# Patient Record
Sex: MALE | Race: WHITE | NOT HISPANIC OR LATINO | Employment: OTHER | URBAN - METROPOLITAN AREA
[De-identification: names, ages, dates, MRNs, and addresses within clinical notes are randomized per-mention and may not be internally consistent; named-entity substitution may affect disease eponyms.]

---

## 2017-01-01 ENCOUNTER — HOSPITAL ENCOUNTER (INPATIENT)
Facility: HOSPITAL | Age: 82
LOS: 2 days | DRG: 283 | End: 2017-07-09
Attending: EMERGENCY MEDICINE | Admitting: HOSPITALIST
Payer: MEDICARE

## 2017-01-01 ENCOUNTER — TELEPHONE (OUTPATIENT)
Dept: INTERNAL MEDICINE | Facility: CLINIC | Age: 82
End: 2017-01-01

## 2017-01-01 VITALS
OXYGEN SATURATION: 90 % | TEMPERATURE: 97 F | BODY MASS INDEX: 25.41 KG/M2 | WEIGHT: 187.63 LBS | SYSTOLIC BLOOD PRESSURE: 57 MMHG | RESPIRATION RATE: 26 BRPM | DIASTOLIC BLOOD PRESSURE: 38 MMHG | HEIGHT: 72 IN | HEART RATE: 104 BPM

## 2017-01-01 DIAGNOSIS — Z66 DNR (DO NOT RESUSCITATE): ICD-10-CM

## 2017-01-01 DIAGNOSIS — E87.20 LACTIC ACIDOSIS: ICD-10-CM

## 2017-01-01 DIAGNOSIS — I21.4 NSTEMI (NON-ST ELEVATED MYOCARDIAL INFARCTION): ICD-10-CM

## 2017-01-01 DIAGNOSIS — I48.19 PERSISTENT ATRIAL FIBRILLATION: ICD-10-CM

## 2017-01-01 DIAGNOSIS — J96.01 ACUTE RESPIRATORY FAILURE WITH HYPOXIA: Primary | ICD-10-CM

## 2017-01-01 DIAGNOSIS — G30.1 LATE ONSET ALZHEIMER'S DISEASE WITH BEHAVIORAL DISTURBANCE: ICD-10-CM

## 2017-01-01 DIAGNOSIS — R65.21 SEPTIC SHOCK: ICD-10-CM

## 2017-01-01 DIAGNOSIS — A41.9 SEPTIC SHOCK: ICD-10-CM

## 2017-01-01 DIAGNOSIS — N17.9 AKI (ACUTE KIDNEY INJURY): ICD-10-CM

## 2017-01-01 DIAGNOSIS — E11.9 TYPE 2 DIABETES MELLITUS WITHOUT COMPLICATION: ICD-10-CM

## 2017-01-01 DIAGNOSIS — F02.818 LATE ONSET ALZHEIMER'S DISEASE WITH BEHAVIORAL DISTURBANCE: ICD-10-CM

## 2017-01-01 LAB
ABO + RH BLD: NORMAL
ALBUMIN SERPL BCP-MCNC: 2.5 G/DL
ALBUMIN SERPL BCP-MCNC: 2.6 G/DL
ALBUMIN SERPL BCP-MCNC: 2.8 G/DL
ALLENS TEST: ABNORMAL
ALP SERPL-CCNC: 49 U/L
ALP SERPL-CCNC: 54 U/L
ALP SERPL-CCNC: 54 U/L
ALT SERPL W/O P-5'-P-CCNC: 27 U/L
ALT SERPL W/O P-5'-P-CCNC: 32 U/L
ALT SERPL W/O P-5'-P-CCNC: 32 U/L
ANION GAP SERPL CALC-SCNC: 12 MMOL/L
ANION GAP SERPL CALC-SCNC: 15 MMOL/L
ANION GAP SERPL CALC-SCNC: 16 MMOL/L
ANION GAP SERPL CALC-SCNC: 22 MMOL/L
AORTIC VALVE REGURGITATION: ABNORMAL
AORTIC VALVE STENOSIS: ABNORMAL
APTT BLDCRRT: 27.1 SEC
AST SERPL-CCNC: 118 U/L
AST SERPL-CCNC: 122 U/L
AST SERPL-CCNC: 64 U/L
BACTERIA UR CULT: NO GROWTH
BASOPHILS # BLD AUTO: 0 K/UL
BASOPHILS # BLD AUTO: 0.01 K/UL
BASOPHILS # BLD AUTO: 0.02 K/UL
BASOPHILS NFR BLD: 0 %
BASOPHILS NFR BLD: 0.1 %
BASOPHILS NFR BLD: 0.3 %
BILIRUB SERPL-MCNC: 0.6 MG/DL
BILIRUB SERPL-MCNC: 0.6 MG/DL
BILIRUB SERPL-MCNC: 0.7 MG/DL
BILIRUB UR QL STRIP: NEGATIVE
BLD GP AB SCN CELLS X3 SERPL QL: NORMAL
BNP SERPL-MCNC: 10 PG/ML
BUN SERPL-MCNC: 27 MG/DL
BUN SERPL-MCNC: 31 MG/DL
BUN SERPL-MCNC: 34 MG/DL
BUN SERPL-MCNC: 56 MG/DL
CALCIUM SERPL-MCNC: 8.9 MG/DL
CALCIUM SERPL-MCNC: 9 MG/DL
CALCIUM SERPL-MCNC: 9 MG/DL
CALCIUM SERPL-MCNC: 9.5 MG/DL
CHLORIDE SERPL-SCNC: 93 MMOL/L
CHLORIDE SERPL-SCNC: 96 MMOL/L
CHLORIDE SERPL-SCNC: 98 MMOL/L
CHLORIDE SERPL-SCNC: 99 MMOL/L
CLARITY UR: CLEAR
CO2 SERPL-SCNC: 14 MMOL/L
CO2 SERPL-SCNC: 16 MMOL/L
CO2 SERPL-SCNC: 16 MMOL/L
CO2 SERPL-SCNC: 17 MMOL/L
COLOR UR: ABNORMAL
CORTIS SERPL-MCNC: 27 UG/DL
CREAT SERPL-MCNC: 1.3 MG/DL
CREAT SERPL-MCNC: 1.4 MG/DL
CREAT SERPL-MCNC: 1.4 MG/DL
CREAT SERPL-MCNC: 2.6 MG/DL
DELSYS: ABNORMAL
DIASTOLIC DYSFUNCTION: YES
DIFFERENTIAL METHOD: ABNORMAL
EOSINOPHIL # BLD AUTO: 0 K/UL
EOSINOPHIL # BLD AUTO: 0 K/UL
EOSINOPHIL # BLD AUTO: 0.1 K/UL
EOSINOPHIL NFR BLD: 0 %
EOSINOPHIL NFR BLD: 0 %
EOSINOPHIL NFR BLD: 1.4 %
EP: 8
ERYTHROCYTE [DISTWIDTH] IN BLOOD BY AUTOMATED COUNT: 12.9 %
ERYTHROCYTE [DISTWIDTH] IN BLOOD BY AUTOMATED COUNT: 13 %
ERYTHROCYTE [DISTWIDTH] IN BLOOD BY AUTOMATED COUNT: 13.3 %
ERYTHROCYTE [SEDIMENTATION RATE] IN BLOOD BY WESTERGREN METHOD: 4 MM/H
EST. GFR  (AFRICAN AMERICAN): 25 ML/MIN/1.73 M^2
EST. GFR  (AFRICAN AMERICAN): 52 ML/MIN/1.73 M^2
EST. GFR  (AFRICAN AMERICAN): 52 ML/MIN/1.73 M^2
EST. GFR  (AFRICAN AMERICAN): 57 ML/MIN/1.73 M^2
EST. GFR  (NON AFRICAN AMERICAN): 21 ML/MIN/1.73 M^2
EST. GFR  (NON AFRICAN AMERICAN): 45 ML/MIN/1.73 M^2
EST. GFR  (NON AFRICAN AMERICAN): 45 ML/MIN/1.73 M^2
EST. GFR  (NON AFRICAN AMERICAN): 49 ML/MIN/1.73 M^2
ESTIMATED AVG GLUCOSE: 111 MG/DL
ESTIMATED PA SYSTOLIC PRESSURE: 41.18
FIO2: 100
GLUCOSE SERPL-MCNC: 232 MG/DL
GLUCOSE SERPL-MCNC: 272 MG/DL
GLUCOSE SERPL-MCNC: 319 MG/DL
GLUCOSE SERPL-MCNC: 336 MG/DL
GLUCOSE UR QL STRIP: NEGATIVE
HBA1C MFR BLD HPLC: 5.5 %
HCO3 UR-SCNC: 20.1 MMOL/L (ref 24–28)
HCT VFR BLD AUTO: 37.6 %
HCT VFR BLD AUTO: 38.4 %
HCT VFR BLD AUTO: 38.9 %
HCT VFR BLD CALC: 37 %PCV (ref 36–54)
HGB BLD-MCNC: 12.9 G/DL
HGB BLD-MCNC: 13 G/DL
HGB BLD-MCNC: 13 G/DL
HGB BLD-MCNC: 13.2 G/DL
HGB UR QL STRIP: NEGATIVE
INR PPP: 1.1
IP: 15
KETONES UR QL STRIP: NEGATIVE
LACTATE SERPL-SCNC: 5.7 MMOL/L
LACTATE SERPL-SCNC: 5.9 MMOL/L
LACTATE SERPL-SCNC: 7.5 MMOL/L
LACTATE SERPL-SCNC: >12 MMOL/L
LEUKOCYTE ESTERASE UR QL STRIP: NEGATIVE
LIPASE SERPL-CCNC: 6 U/L
LYMPHOCYTES # BLD AUTO: 0.4 K/UL
LYMPHOCYTES # BLD AUTO: 1.1 K/UL
LYMPHOCYTES # BLD AUTO: 1.3 K/UL
LYMPHOCYTES NFR BLD: 2.6 %
LYMPHOCYTES NFR BLD: 20.8 %
LYMPHOCYTES NFR BLD: 6.5 %
MAGNESIUM SERPL-MCNC: 2.1 MG/DL
MAGNESIUM SERPL-MCNC: 2.3 MG/DL
MAGNESIUM SERPL-MCNC: 2.8 MG/DL
MCH RBC QN AUTO: 30.9 PG
MCH RBC QN AUTO: 31.3 PG
MCH RBC QN AUTO: 31.4 PG
MCHC RBC AUTO-ENTMCNC: 33.2 %
MCHC RBC AUTO-ENTMCNC: 33.9 %
MCHC RBC AUTO-ENTMCNC: 35.1 %
MCV RBC AUTO: 90 FL
MCV RBC AUTO: 92 FL
MCV RBC AUTO: 93 FL
MITRAL VALVE MOBILITY: NORMAL
MITRAL VALVE REGURGITATION: ABNORMAL
MODE: ABNORMAL
MONOCYTES # BLD AUTO: 0 K/UL
MONOCYTES # BLD AUTO: 0.6 K/UL
MONOCYTES # BLD AUTO: 1.1 K/UL
MONOCYTES NFR BLD: 0.3 %
MONOCYTES NFR BLD: 3.4 %
MONOCYTES NFR BLD: 6.7 %
NEUTROPHILS # BLD AUTO: 14.1 K/UL
NEUTROPHILS # BLD AUTO: 15.2 K/UL
NEUTROPHILS # BLD AUTO: 4.8 K/UL
NEUTROPHILS NFR BLD: 76.9 %
NEUTROPHILS NFR BLD: 86.3 %
NEUTROPHILS NFR BLD: 93.8 %
NITRITE UR QL STRIP: NEGATIVE
PCO2 BLDA: 47.5 MMHG (ref 35–45)
PH SMN: 7.23 [PH] (ref 7.35–7.45)
PH UR STRIP: 6 [PH] (ref 5–8)
PHOSPHATE SERPL-MCNC: 3.7 MG/DL
PHOSPHATE SERPL-MCNC: 5.3 MG/DL
PHOSPHATE SERPL-MCNC: 6.1 MG/DL
PLATELET # BLD AUTO: 230 K/UL
PLATELET # BLD AUTO: 236 K/UL
PLATELET # BLD AUTO: 264 K/UL
PLATELET BLD QL SMEAR: ABNORMAL
PMV BLD AUTO: 9.1 FL
PMV BLD AUTO: 9.1 FL
PMV BLD AUTO: 9.7 FL
PO2 BLDA: 27 MMHG (ref 80–100)
POC BE: -7 MMOL/L
POC IONIZED CALCIUM: 1.03 MMOL/L (ref 1.06–1.42)
POC SATURATED O2: 40 % (ref 95–100)
POC TCO2: 22 MMOL/L (ref 23–27)
POCT GLUCOSE: 191 MG/DL (ref 70–110)
POCT GLUCOSE: 198 MG/DL (ref 70–110)
POCT GLUCOSE: 221 MG/DL (ref 70–110)
POCT GLUCOSE: 247 MG/DL (ref 70–110)
POCT GLUCOSE: 256 MG/DL (ref 70–110)
POCT GLUCOSE: 261 MG/DL (ref 70–110)
POCT GLUCOSE: 324 MG/DL (ref 70–110)
POTASSIUM BLD-SCNC: 6.3 MMOL/L (ref 3.5–5.1)
POTASSIUM SERPL-SCNC: 3.7 MMOL/L
POTASSIUM SERPL-SCNC: 4.2 MMOL/L
POTASSIUM SERPL-SCNC: 4.4 MMOL/L
POTASSIUM SERPL-SCNC: 5.4 MMOL/L
PROCALCITONIN SERPL IA-MCNC: 1.19 NG/ML
PROT SERPL-MCNC: 6.1 G/DL
PROT SERPL-MCNC: 6.1 G/DL
PROT SERPL-MCNC: 6.2 G/DL
PROT UR QL STRIP: ABNORMAL
PROTHROMBIN TIME: 11.7 SEC
RBC # BLD AUTO: 4.16 M/UL
RBC # BLD AUTO: 4.17 M/UL
RBC # BLD AUTO: 4.2 M/UL
RETIRED EF AND QEF - SEE NOTES: 20 (ref 55–65)
SAMPLE: ABNORMAL
SITE: ABNORMAL
SODIUM BLD-SCNC: 124 MMOL/L (ref 136–145)
SODIUM SERPL-SCNC: 127 MMOL/L
SODIUM SERPL-SCNC: 127 MMOL/L
SODIUM SERPL-SCNC: 129 MMOL/L
SODIUM SERPL-SCNC: 131 MMOL/L
SP GR UR STRIP: 1.02 (ref 1–1.03)
SPONT RATE: 28
TRICUSPID VALVE REGURGITATION: ABNORMAL
TROPONIN I SERPL DL<=0.01 NG/ML-MCNC: 14.11 NG/ML
TROPONIN I SERPL DL<=0.01 NG/ML-MCNC: 14.48 NG/ML
TROPONIN I SERPL DL<=0.01 NG/ML-MCNC: 14.48 NG/ML
TROPONIN I SERPL DL<=0.01 NG/ML-MCNC: 16.27 NG/ML
TROPONIN I SERPL DL<=0.01 NG/ML-MCNC: 16.41 NG/ML
TROPONIN I SERPL DL<=0.01 NG/ML-MCNC: 19 NG/ML
TSH SERPL DL<=0.005 MIU/L-ACNC: 2.97 UIU/ML
URN SPEC COLLECT METH UR: ABNORMAL
UROBILINOGEN UR STRIP-ACNC: NEGATIVE EU/DL
WBC # BLD AUTO: 16.16 K/UL
WBC # BLD AUTO: 16.39 K/UL
WBC # BLD AUTO: 9 K/UL

## 2017-01-01 PROCEDURE — 82962 GLUCOSE BLOOD TEST: CPT

## 2017-01-01 PROCEDURE — 63600175 PHARM REV CODE 636 W HCPCS: Performed by: HOSPITALIST

## 2017-01-01 PROCEDURE — 83735 ASSAY OF MAGNESIUM: CPT

## 2017-01-01 PROCEDURE — 84100 ASSAY OF PHOSPHORUS: CPT

## 2017-01-01 PROCEDURE — 99900035 HC TECH TIME PER 15 MIN (STAT)

## 2017-01-01 PROCEDURE — 27100171 HC OXYGEN HIGH FLOW UP TO 24 HOURS

## 2017-01-01 PROCEDURE — 84484 ASSAY OF TROPONIN QUANT: CPT

## 2017-01-01 PROCEDURE — 85025 COMPLETE CBC W/AUTO DIFF WBC: CPT

## 2017-01-01 PROCEDURE — 87077 CULTURE AEROBIC IDENTIFY: CPT

## 2017-01-01 PROCEDURE — 63600175 PHARM REV CODE 636 W HCPCS: Performed by: EMERGENCY MEDICINE

## 2017-01-01 PROCEDURE — 94640 AIRWAY INHALATION TREATMENT: CPT

## 2017-01-01 PROCEDURE — 83036 HEMOGLOBIN GLYCOSYLATED A1C: CPT

## 2017-01-01 PROCEDURE — 85610 PROTHROMBIN TIME: CPT

## 2017-01-01 PROCEDURE — 85730 THROMBOPLASTIN TIME PARTIAL: CPT

## 2017-01-01 PROCEDURE — 93005 ELECTROCARDIOGRAM TRACING: CPT

## 2017-01-01 PROCEDURE — 80053 COMPREHEN METABOLIC PANEL: CPT

## 2017-01-01 PROCEDURE — 81003 URINALYSIS AUTO W/O SCOPE: CPT

## 2017-01-01 PROCEDURE — 94761 N-INVAS EAR/PLS OXIMETRY MLT: CPT

## 2017-01-01 PROCEDURE — 96375 TX/PRO/DX INJ NEW DRUG ADDON: CPT

## 2017-01-01 PROCEDURE — 36415 COLL VENOUS BLD VENIPUNCTURE: CPT

## 2017-01-01 PROCEDURE — 83605 ASSAY OF LACTIC ACID: CPT | Mod: 91

## 2017-01-01 PROCEDURE — 80048 BASIC METABOLIC PNL TOTAL CA: CPT

## 2017-01-01 PROCEDURE — 25000242 PHARM REV CODE 250 ALT 637 W/ HCPCS: Performed by: HOSPITALIST

## 2017-01-01 PROCEDURE — 27000190 HC CPAP FULL FACE MASK W/VALVE

## 2017-01-01 PROCEDURE — 84484 ASSAY OF TROPONIN QUANT: CPT | Mod: 91

## 2017-01-01 PROCEDURE — 96367 TX/PROPH/DG ADDL SEQ IV INF: CPT

## 2017-01-01 PROCEDURE — 36600 WITHDRAWAL OF ARTERIAL BLOOD: CPT

## 2017-01-01 PROCEDURE — 93306 TTE W/DOPPLER COMPLETE: CPT | Mod: 26,,, | Performed by: INTERNAL MEDICINE

## 2017-01-01 PROCEDURE — 82533 TOTAL CORTISOL: CPT

## 2017-01-01 PROCEDURE — 96365 THER/PROPH/DIAG IV INF INIT: CPT

## 2017-01-01 PROCEDURE — 83880 ASSAY OF NATRIURETIC PEPTIDE: CPT

## 2017-01-01 PROCEDURE — 87086 URINE CULTURE/COLONY COUNT: CPT

## 2017-01-01 PROCEDURE — 87147 CULTURE TYPE IMMUNOLOGIC: CPT

## 2017-01-01 PROCEDURE — 83690 ASSAY OF LIPASE: CPT

## 2017-01-01 PROCEDURE — 86900 BLOOD TYPING SEROLOGIC ABO: CPT

## 2017-01-01 PROCEDURE — 84145 PROCALCITONIN (PCT): CPT

## 2017-01-01 PROCEDURE — 25000003 PHARM REV CODE 250: Performed by: EMERGENCY MEDICINE

## 2017-01-01 PROCEDURE — 83605 ASSAY OF LACTIC ACID: CPT

## 2017-01-01 PROCEDURE — 20000000 HC ICU ROOM

## 2017-01-01 PROCEDURE — 99291 CRITICAL CARE FIRST HOUR: CPT | Mod: 25

## 2017-01-01 PROCEDURE — 02HV33Z INSERTION OF INFUSION DEVICE INTO SUPERIOR VENA CAVA, PERCUTANEOUS APPROACH: ICD-10-PCS | Performed by: HOSPITALIST

## 2017-01-01 PROCEDURE — 86901 BLOOD TYPING SEROLOGIC RH(D): CPT

## 2017-01-01 PROCEDURE — 86850 RBC ANTIBODY SCREEN: CPT

## 2017-01-01 PROCEDURE — 96366 THER/PROPH/DIAG IV INF ADDON: CPT

## 2017-01-01 PROCEDURE — 87040 BLOOD CULTURE FOR BACTERIA: CPT | Mod: 59

## 2017-01-01 PROCEDURE — 63600175 PHARM REV CODE 636 W HCPCS

## 2017-01-01 PROCEDURE — 25000003 PHARM REV CODE 250: Performed by: HOSPITALIST

## 2017-01-01 PROCEDURE — 87186 SC STD MICRODIL/AGAR DIL: CPT | Mod: 59

## 2017-01-01 PROCEDURE — 84443 ASSAY THYROID STIM HORMONE: CPT

## 2017-01-01 PROCEDURE — 94660 CPAP INITIATION&MGMT: CPT

## 2017-01-01 PROCEDURE — 82803 BLOOD GASES ANY COMBINATION: CPT

## 2017-01-01 PROCEDURE — 36556 INSERT NON-TUNNEL CV CATH: CPT

## 2017-01-01 PROCEDURE — 93306 TTE W/DOPPLER COMPLETE: CPT

## 2017-01-01 RX ORDER — ONDANSETRON 2 MG/ML
4 INJECTION INTRAMUSCULAR; INTRAVENOUS EVERY 8 HOURS PRN
Status: DISCONTINUED | OUTPATIENT
Start: 2017-01-01 | End: 2017-01-01 | Stop reason: HOSPADM

## 2017-01-01 RX ORDER — SODIUM CHLORIDE 9 MG/ML
1000 INJECTION, SOLUTION INTRAVENOUS
Status: COMPLETED | OUTPATIENT
Start: 2017-01-01 | End: 2017-01-01

## 2017-01-01 RX ORDER — HALOPERIDOL 5 MG/ML
2 INJECTION INTRAMUSCULAR EVERY 6 HOURS PRN
Status: DISCONTINUED | OUTPATIENT
Start: 2017-01-01 | End: 2017-01-01

## 2017-01-01 RX ORDER — ASPIRIN 300 MG/1
300 SUPPOSITORY RECTAL DAILY
Status: DISCONTINUED | OUTPATIENT
Start: 2017-01-01 | End: 2017-01-01

## 2017-01-01 RX ORDER — IPRATROPIUM BROMIDE AND ALBUTEROL SULFATE 2.5; .5 MG/3ML; MG/3ML
3 SOLUTION RESPIRATORY (INHALATION) EVERY 4 HOURS
Status: DISCONTINUED | OUTPATIENT
Start: 2017-01-01 | End: 2017-01-01 | Stop reason: HOSPADM

## 2017-01-01 RX ORDER — LORAZEPAM 2 MG/ML
1 CONCENTRATE ORAL
Status: DISCONTINUED | OUTPATIENT
Start: 2017-01-01 | End: 2017-01-01 | Stop reason: HOSPADM

## 2017-01-01 RX ORDER — FUROSEMIDE 10 MG/ML
40 INJECTION INTRAMUSCULAR; INTRAVENOUS ONCE
Status: COMPLETED | OUTPATIENT
Start: 2017-01-01 | End: 2017-01-01

## 2017-01-01 RX ORDER — INSULIN ASPART 100 [IU]/ML
0-5 INJECTION, SOLUTION INTRAVENOUS; SUBCUTANEOUS EVERY 6 HOURS PRN
Status: DISCONTINUED | OUTPATIENT
Start: 2017-01-01 | End: 2017-01-01 | Stop reason: HOSPADM

## 2017-01-01 RX ORDER — CEFEPIME HYDROCHLORIDE 1 G/50ML
1 INJECTION, SOLUTION INTRAVENOUS
Status: DISCONTINUED | OUTPATIENT
Start: 2017-01-01 | End: 2017-01-01 | Stop reason: HOSPADM

## 2017-01-01 RX ORDER — ENOXAPARIN SODIUM 100 MG/ML
40 INJECTION SUBCUTANEOUS EVERY 24 HOURS
Status: DISCONTINUED | OUTPATIENT
Start: 2017-01-01 | End: 2017-01-01

## 2017-01-01 RX ORDER — CEFEPIME HYDROCHLORIDE 1 G/50ML
1 INJECTION, SOLUTION INTRAVENOUS
Status: COMPLETED | OUTPATIENT
Start: 2017-01-01 | End: 2017-01-01

## 2017-01-01 RX ORDER — HALOPERIDOL 5 MG/ML
5 INJECTION INTRAMUSCULAR EVERY 6 HOURS PRN
Status: DISCONTINUED | OUTPATIENT
Start: 2017-01-01 | End: 2017-01-01 | Stop reason: HOSPADM

## 2017-01-01 RX ORDER — CIPROFLOXACIN 500 MG/1
500 TABLET ORAL 2 TIMES DAILY
Status: ON HOLD | COMMUNITY
Start: 2017-01-01 | End: 2017-01-01 | Stop reason: HOSPADM

## 2017-01-01 RX ORDER — SODIUM CHLORIDE 0.9 % (FLUSH) 0.9 %
3 SYRINGE (ML) INJECTION EVERY 8 HOURS
Status: DISCONTINUED | OUTPATIENT
Start: 2017-01-01 | End: 2017-01-01 | Stop reason: HOSPADM

## 2017-01-01 RX ORDER — MORPHINE SULFATE 2 MG/ML
1 INJECTION, SOLUTION INTRAMUSCULAR; INTRAVENOUS EVERY 4 HOURS PRN
Status: DISCONTINUED | OUTPATIENT
Start: 2017-01-01 | End: 2017-01-01

## 2017-01-01 RX ORDER — GLUCAGON 1 MG
1 KIT INJECTION
Status: DISCONTINUED | OUTPATIENT
Start: 2017-01-01 | End: 2017-01-01 | Stop reason: HOSPADM

## 2017-01-01 RX ORDER — NOREPINEPHRINE BITARTRATE/D5W 16MG/250ML
0.05 PLASTIC BAG, INJECTION (ML) INTRAVENOUS CONTINUOUS
Status: DISCONTINUED | OUTPATIENT
Start: 2017-01-01 | End: 2017-01-01

## 2017-01-01 RX ORDER — CEFEPIME HYDROCHLORIDE 1 G/50ML
1 INJECTION, SOLUTION INTRAVENOUS
Status: DISCONTINUED | OUTPATIENT
Start: 2017-01-01 | End: 2017-01-01

## 2017-01-01 RX ORDER — MORPHINE SULFATE 2 MG/ML
1 INJECTION, SOLUTION INTRAMUSCULAR; INTRAVENOUS
Status: DISCONTINUED | OUTPATIENT
Start: 2017-01-01 | End: 2017-01-01 | Stop reason: HOSPADM

## 2017-01-01 RX ORDER — SODIUM CHLORIDE 9 MG/ML
INJECTION, SOLUTION INTRAVENOUS CONTINUOUS
Status: DISCONTINUED | OUTPATIENT
Start: 2017-01-01 | End: 2017-01-01

## 2017-01-01 RX ORDER — ACETAMINOPHEN 650 MG/1
650 SUPPOSITORY RECTAL EVERY 4 HOURS PRN
Status: DISCONTINUED | OUTPATIENT
Start: 2017-01-01 | End: 2017-01-01 | Stop reason: HOSPADM

## 2017-01-01 RX ORDER — MORPHINE SULFATE 2 MG/ML
INJECTION, SOLUTION INTRAMUSCULAR; INTRAVENOUS
Status: DISPENSED
Start: 2017-01-01 | End: 2017-01-01

## 2017-01-01 RX ADMIN — FUROSEMIDE 40 MG: 10 INJECTION, SOLUTION INTRAMUSCULAR; INTRAVENOUS at 01:07

## 2017-01-01 RX ADMIN — MORPHINE SULFATE 1 MG: 2 INJECTION, SOLUTION INTRAMUSCULAR; INTRAVENOUS at 06:07

## 2017-01-01 RX ADMIN — IPRATROPIUM BROMIDE AND ALBUTEROL SULFATE 3 ML: .5; 3 SOLUTION RESPIRATORY (INHALATION) at 04:07

## 2017-01-01 RX ADMIN — SODIUM CHLORIDE 1000 ML: 0.9 INJECTION, SOLUTION INTRAVENOUS at 02:07

## 2017-01-01 RX ADMIN — HALOPERIDOL LACTATE 2 MG: 5 INJECTION, SOLUTION INTRAMUSCULAR at 10:07

## 2017-01-01 RX ADMIN — MORPHINE SULFATE: 2 INJECTION, SOLUTION INTRAMUSCULAR; INTRAVENOUS at 08:07

## 2017-01-01 RX ADMIN — IPRATROPIUM BROMIDE AND ALBUTEROL SULFATE 3 ML: .5; 3 SOLUTION RESPIRATORY (INHALATION) at 08:07

## 2017-01-01 RX ADMIN — VANCOMYCIN HYDROCHLORIDE 1250 MG: 1 INJECTION, POWDER, LYOPHILIZED, FOR SOLUTION INTRAVENOUS at 09:07

## 2017-01-01 RX ADMIN — SODIUM CHLORIDE, PRESERVATIVE FREE 3 ML: 5 INJECTION INTRAVENOUS at 06:07

## 2017-01-01 RX ADMIN — VANCOMYCIN HYDROCHLORIDE 1000 MG: 1 INJECTION, POWDER, LYOPHILIZED, FOR SOLUTION INTRAVENOUS at 02:07

## 2017-01-01 RX ADMIN — INSULIN ASPART 1 UNITS: 100 INJECTION, SOLUTION INTRAVENOUS; SUBCUTANEOUS at 01:07

## 2017-01-01 RX ADMIN — MORPHINE SULFATE 1 MG: 2 INJECTION, SOLUTION INTRAMUSCULAR; INTRAVENOUS at 12:07

## 2017-01-01 RX ADMIN — INSULIN ASPART 2 UNITS: 100 INJECTION, SOLUTION INTRAVENOUS; SUBCUTANEOUS at 07:07

## 2017-01-01 RX ADMIN — CEFEPIME HYDROCHLORIDE 1 G: 1 INJECTION, SOLUTION INTRAVENOUS at 04:07

## 2017-01-01 RX ADMIN — IPRATROPIUM BROMIDE AND ALBUTEROL SULFATE 3 ML: .5; 3 SOLUTION RESPIRATORY (INHALATION) at 12:07

## 2017-01-01 RX ADMIN — HALOPERIDOL LACTATE 2 MG: 5 INJECTION, SOLUTION INTRAMUSCULAR at 04:07

## 2017-01-01 RX ADMIN — SODIUM CHLORIDE, PRESERVATIVE FREE 3 ML: 5 INJECTION INTRAVENOUS at 01:07

## 2017-01-01 RX ADMIN — ASPIRIN 300 MG: 300 SUPPOSITORY RECTAL at 08:07

## 2017-01-01 RX ADMIN — INSULIN ASPART 3 UNITS: 100 INJECTION, SOLUTION INTRAVENOUS; SUBCUTANEOUS at 06:07

## 2017-01-01 RX ADMIN — SODIUM CHLORIDE 1000 ML: 0.9 INJECTION, SOLUTION INTRAVENOUS at 04:07

## 2017-01-01 RX ADMIN — SODIUM CHLORIDE: 0.9 INJECTION, SOLUTION INTRAVENOUS at 08:07

## 2017-01-01 RX ADMIN — Medication 0.05 MCG/KG/MIN: at 05:07

## 2017-01-01 RX ADMIN — CEFEPIME HYDROCHLORIDE 1 G: 1 INJECTION, SOLUTION INTRAVENOUS at 09:07

## 2017-01-01 RX ADMIN — ENOXAPARIN SODIUM 40 MG: 100 INJECTION SUBCUTANEOUS at 08:07

## 2017-01-01 RX ADMIN — SODIUM CHLORIDE: 0.9 INJECTION, SOLUTION INTRAVENOUS at 05:07

## 2017-01-01 RX ADMIN — ENOXAPARIN SODIUM 40 MG: 100 INJECTION SUBCUTANEOUS at 04:07

## 2017-01-01 RX ADMIN — CEFEPIME HYDROCHLORIDE 1 G: 1 INJECTION, SOLUTION INTRAVENOUS at 07:07

## 2017-01-01 RX ADMIN — MORPHINE SULFATE 1 MG: 2 INJECTION, SOLUTION INTRAMUSCULAR; INTRAVENOUS at 03:07

## 2017-01-01 RX ADMIN — SODIUM CHLORIDE, PRESERVATIVE FREE 3 ML: 5 INJECTION INTRAVENOUS at 10:07

## 2017-01-01 RX ADMIN — ASPIRIN 300 MG: 300 SUPPOSITORY RECTAL at 05:07

## 2017-01-01 RX ADMIN — LORAZEPAM 1 MG: 2 SOLUTION, CONCENTRATE ORAL at 06:07

## 2017-01-01 RX ADMIN — VANCOMYCIN HYDROCHLORIDE 500 MG: 500 INJECTION, POWDER, LYOPHILIZED, FOR SOLUTION INTRAVENOUS at 09:07

## 2017-04-18 NOTE — TELEPHONE ENCOUNTER
----- Message from Nicolas Meyer MA sent at 4/18/2017 12:48 PM CDT -----  Contact: wife / Tory - [phone]   Received a letter for a f/u appt. Cost almost $2,000 to bring the patient to the office for his appts by ambulance. Requesting to speak with Dr Becerra. Please call. Thanks!

## 2017-04-18 NOTE — TELEPHONE ENCOUNTER
Spoke with patients wife.. She stated she is unable to get  to appointments and would like name and address taken off notices.... Patient is in Home Hospice and isn't in condition to leave home.

## 2017-07-07 PROBLEM — R74.8 ABNORMAL LIVER ENZYMES: Status: ACTIVE | Noted: 2017-01-01

## 2017-07-07 PROBLEM — R57.0 CARDIOGENIC SHOCK: Status: ACTIVE | Noted: 2017-01-01

## 2017-07-07 PROBLEM — E44.0 MALNUTRITION OF MODERATE DEGREE: Status: ACTIVE | Noted: 2017-01-01

## 2017-07-07 PROBLEM — A41.9 SEPTIC SHOCK: Status: ACTIVE | Noted: 2017-01-01

## 2017-07-07 PROBLEM — E87.1 HYPONATREMIA: Status: ACTIVE | Noted: 2017-01-01

## 2017-07-07 PROBLEM — N17.9 AKI (ACUTE KIDNEY INJURY): Status: ACTIVE | Noted: 2017-01-01

## 2017-07-07 PROBLEM — R73.9 HYPERGLYCEMIA: Status: ACTIVE | Noted: 2017-01-01

## 2017-07-07 PROBLEM — E87.20 LACTIC ACIDOSIS: Status: ACTIVE | Noted: 2017-01-01

## 2017-07-07 PROBLEM — R65.21 SEPTIC SHOCK: Status: ACTIVE | Noted: 2017-01-01

## 2017-07-07 PROBLEM — J96.01 ACUTE RESPIRATORY FAILURE WITH HYPOXIA: Status: ACTIVE | Noted: 2017-01-01

## 2017-07-07 PROBLEM — I21.4 NSTEMI (NON-ST ELEVATED MYOCARDIAL INFARCTION): Status: ACTIVE | Noted: 2017-01-01

## 2017-07-07 PROBLEM — Z66 DNR (DO NOT RESUSCITATE): Status: ACTIVE | Noted: 2017-01-01

## 2017-07-07 PROBLEM — D63.8 ANEMIA OF CHRONIC DISEASE: Status: ACTIVE | Noted: 2017-01-01

## 2017-07-07 NOTE — PROGRESS NOTES
07/07/17 1412   Patient Assessment/Suction   Level of Consciousness (AVPU) alert   Labs   $ Was an ABG obtained? ISTAT - Blood gas;Arterial Puncture  (could only get venous)   Critical Value Communication   Notified Physician/Designee fort   Date Result Received 07/07/17   Time Result Received 1412   Resulting Department of Critical Value resp   Who communicated critical value from resulting department? cornel   Critical Test #1 PO2   Critical Test #1 Result 27   Critical Test #2 pH   Critical Test #2 Result 7.234   Date Notified 07/07/17   Time Notified 1414   Read Back Verification Yes   Physician Directive no new orders   Assumed to be VBG.  MD did not request redraw.

## 2017-07-07 NOTE — ED PROVIDER NOTES
Encounter Date: 7/7/2017       History     Chief Complaint   Patient presents with    Shortness of Breath     sob since yesterday, arrived on CPAP via EMS, patient on hospice     This is an 86 y/o M with history of 2 weeks of worsening delirium with poor po intake and severe worsening respiratory distress over the past 2 days.  Patient was on home hospice due to ?dementia - patient has been bedbound for 3 years following a series of admissions with difficult to tx urinary tract infections, cellulitis, bacteremia and sepsis. The patient no longer attends physician appointments due to severe mobility problems.  Wife reports no change in urine output, no skin breakdown.  When EMS arrived patient was in significant distress and had oxygen saturation in 60-70%.  CPAP administered en route and patient hypotensive, lethargic.        The history is provided by the spouse, the EMS personnel and a relative.   Shortness of Breath       Review of patient's allergies indicates:   Allergen Reactions    Meperidine      Other reaction(s): Unknown     Past Medical History:   Diagnosis Date    Aortic stenosis     BPH (benign prostatic hypertrophy)     Dementia     Diabetes mellitus type II     Hyperlipidemia     MGUS (monoclonal gammopathy of unknown significance)     PVD (peripheral vascular disease)      Past Surgical History:   Procedure Laterality Date    APPENDECTOMY      CHOLECYSTECTOMY      SHOULDER SURGERY      right and left, decrease ROM of left.     Family History   Problem Relation Age of Onset    Macular degeneration Mother     Retinal detachment Neg Hx     Strabismus Neg Hx     Glaucoma Neg Hx     Cataracts Neg Hx     Blindness Neg Hx     Amblyopia Neg Hx      Social History   Substance Use Topics    Smoking status: Former Smoker     Types: Cigarettes, Pipe, Cigars    Smokeless tobacco: Never Used    Alcohol use No     Review of Systems   Unable to perform ROS: Dementia   Respiratory: Positive for  shortness of breath.        Physical Exam     Initial Vitals   BP Pulse Resp Temp SpO2   07/07/17 1349 07/07/17 1349 07/07/17 1349 07/07/17 1329 07/07/17 1329   (!) 94/58 89 20 98.8 °F (37.1 °C) 100 %      MAP       07/07/17 1349       70         Physical Exam    Nursing note and vitals reviewed.  Constitutional: He is diaphoretic. He appears distressed.   HENT:   Head: Normocephalic and atraumatic.   Eyes: Conjunctivae and EOM are normal. Pupils are equal, round, and reactive to light.   Neck: Normal range of motion. Neck supple.   Cardiovascular: Normal heart sounds and intact distal pulses. Exam reveals no gallop and no friction rub.    No murmur heard.  Tachycardic with rate in 120s, irregular   Pulmonary/Chest: No stridor. He is in respiratory distress. He has wheezes. He has rhonchi. He has no rales. He exhibits no tenderness.   +accessory muscle use, severe respiratory distress on CPAP   Abdominal: Soft. Bowel sounds are normal. He exhibits no distension. There is no tenderness. There is no rebound and no guarding.   Musculoskeletal:   Generalized weakness without focal symptoms    Neurological:   Lethargic without focal weakness noted    Skin: Capillary refill takes less than 2 seconds. There is pallor.   Diaphoretic         ED Course   Critical Care  Date/Time: 7/7/2017 8:40 PM  Performed by: SILVANO SERNA  Authorized by: SLY HOANG   Direct patient critical care time: 75 minutes  Additional history critical care time: 15 minutes  Ordering / reviewing critical care time: 15 minutes  Documentation critical care time: 20 minutes  Consulting other physicians critical care time: 10 minutes  Consult with family critical care time: 45 minutes  Total critical care time (exclusive of procedural time) : 180 minutes  Critical care time was exclusive of separately billable procedures and treating other patients.  Critical care was necessary to treat or prevent imminent or life-threatening deterioration of the  "following conditions: circulatory failure, dehydration, sepsis, shock, respiratory failure and renal failure.  Critical care was time spent personally by me on the following activities: development of treatment plan with patient or surrogate, examination of patient, evaluation of patient's response to treatment, obtaining history from patient or surrogate, ordering and performing treatments and interventions, ordering and review of laboratory studies, ordering and review of radiographic studies, pulse oximetry, re-evaluation of patient's condition, review of old charts and vascular access procedures.    Central Line  Date/Time: 7/7/2017 8:42 PM  Location procedure was performed: [facility] EMERGENCY DEPARTMENT  Performed by: SILVANO SERNA  Pre-operative Diagnosis: septic shock  Post-operative diagnosis: septic shock  Time out: Immediately prior to procedure a "time out" was called to verify the correct patient, procedure, equipment, support staff and site/side marked as required.  Indications: vascular access and med administration  Preparation: skin prepped with ChloraPrep  Skin prep agent dried: skin prep agent completely dried prior to procedure  Sterile barriers: all five maximum sterile barriers used - cap, mask, sterile gown, sterile gloves, and large sterile sheet  Hand hygiene: hand hygiene performed prior to central venous catheter insertion  Location details: right internal jugular  Catheter type: triple lumen  Ultrasound guidance: yes  Number of attempts: 1  Assessment: placement verified by x-ray,  no pneumothorax on x-ray and successful placement  Complications: none  Estimated blood loss (mL): 0  Specimens: Yes (Lactate, tn)  Implants: No  Post-procedure: line sutured,  sterile dressing applied and blood return through all ports  Complications: No        Labs Reviewed   CBC W/ AUTO DIFFERENTIAL - Abnormal; Notable for the following:        Result Value    RBC 4.17 (*)     Hemoglobin 12.9 (*)     Hematocrit " 38.9 (*)     MPV 9.1 (*)     Mono # 0.0 (*)     Gran% 76.9 (*)     Mono% 0.3 (*)     All other components within normal limits   COMPREHENSIVE METABOLIC PANEL - Abnormal; Notable for the following:     Sodium 129 (*)     Chloride 93 (*)     CO2 14 (*)     Glucose 232 (*)     BUN, Bld 27 (*)     Albumin 2.8 (*)     Alkaline Phosphatase 54 (*)      (*)     Anion Gap 22 (*)     eGFR if  52 (*)     eGFR if non  45 (*)     All other components within normal limits   LACTIC ACID, PLASMA - Abnormal; Notable for the following:     Lactate (Lactic Acid) >12.0 (*)     All other components within normal limits    Narrative:        LACT critical result(s)repeated, verified, called and verbal   readback obtained from Lorena Ramirez RN, 07/07/2017 14:20   PHOSPHORUS - Abnormal; Notable for the following:     Phosphorus 5.3 (*)     All other components within normal limits   TROPONIN I - Abnormal; Notable for the following:     Troponin I 16.415 (*)     All other components within normal limits   URINALYSIS - Abnormal; Notable for the following:     Color, UA Brown (*)     Protein, UA Trace (*)     All other components within normal limits   LACTIC ACID, PLASMA - Abnormal; Notable for the following:     Lactate (Lactic Acid) 7.5 (*)     All other components within normal limits    Narrative:      LACT  critical result(s) repeated, verified, called and verbal   readback obtained from Gaby Lopez RN, 07/07/2017 16:30   TROPONIN I - Abnormal; Notable for the following:     Troponin I 14.112 (*)     All other components within normal limits   ISTAT PROCEDURE - Abnormal; Notable for the following:     POC PH 7.234 (*)     POC PCO2 47.5 (*)     POC PO2 27 (*)     POC HCO3 20.1 (*)     POC SATURATED O2 40 (*)     POC Sodium 124 (*)     POC Potassium 6.3 (*)     POC TCO2 22 (*)     POC Ionized Calcium 1.03 (*)     All other components within normal limits   POCT GLUCOSE - Abnormal; Notable for the  following:     POCT Glucose 198 (*)     All other components within normal limits   CULTURE, BLOOD   CULTURE, BLOOD   APTT   B-TYPE NATRIURETIC PEPTIDE   LIPASE   MAGNESIUM   PROTIME-INR   TSH   LACTIC ACID, PLASMA   TYPE & SCREEN   INDIRECT ANTIGLOBULIN TEST     EKG Readings: (Independently Interpreted)   Rhythm: Atrial Fibrillation. Heart Rate: 120s . ST Segments: Non-Specific ST Segment Depression. Axis: Left Axis Deviation. Clinical Impression: Atrial Fibrillation with RVR     Imaging Results          X-Ray Chest 1 View for PICC_Central line (Final result)  Result time 07/07/17 17:06:34    Final result by Dorian Leach MD (07/07/17 17:06:34)                 Impression:     Interval placement RIGHT internal jugular catheter.  No evidence for pneumothorax.        Electronically signed by: Dr. Dorian Leach MD  Date:     07/07/17  Time:    17:06              Narrative:    Comparison: 7/7/17     Findings:Single view examination.LEFT humeral prosthesis.  Surgical screws RIGHT humerus.  RIGHT internal jugular catheter with tip at level superior vena cava.  No pneumothorax. The cardiomediastinal contour is within normal limits. No findings to suggest pleural effusions. The lungs are clear.                             X-Ray Chest AP Portable (Final result)  Result time 07/07/17 14:07:09    Final result by Riaz Oreilly MD (07/07/17 14:07:09)                 Impression:        Retrocardiac opacity suggesting left basilar atelectasis or pneumonia.      Electronically signed by: RIAZ OREILLY MD  Date:     07/07/17  Time:    14:07              Narrative:    History: Sepsis.    Procedure: Chest portable one view    Findings:    Examination is compared with a study of 4/16/2011.  There is a retrocardiac opacity in the left lower lobe suggesting a left basilar atelectasis/infiltrate.  No pleural effusion.  Rest of the left upper lobe is clear the right lung is clear.  There is borderline cardiomegaly.  Atherosclerosis  of the aortic arch.                                 Medical Decision Making:   Initial Assessment:   Patient presents in severe distress, family at bedside report patient was on hospice but initially requested the patient treated with maximal support despite prior DNR/DNI wishes.  Extensive bedside discussion with son, spouse and physician grandson yielded plan to provide support short of intubation or CPR.  His family is aware of the critical nature of his illness and consented for placement of central line, pressors, BIPAP, fluid and antibiotic therapy.   Differential Diagnosis:   Respiratory failure due to infection, renal failure, HTN, heart failure, MI, electrolyte abnormality, sepsis, COPD, PE  Clinical Tests:   Lab Tests: Ordered and Reviewed  The following lab test(s) were unremarkable: CBC, CMP, Lactate and Troponin  Radiological Study: Ordered and Reviewed  Medical Tests: Ordered and Reviewed  ED Management:  Patient improved with IV fluid bolus and support with BIPAP. Became more alert and able to follow commands.  BP started to drift downward again and patient placed on levophed for pressure support                    ED Course     Clinical Impression:   The primary encounter diagnosis was Acute respiratory failure with hypoxia. Diagnoses of Septic shock, NSTEMI (non-ST elevated myocardial infarction), LORI (acute kidney injury), Lactic acidosis, DNR (do not resuscitate), Persistent atrial fibrillation, and Late onset Alzheimer's disease with behavioral disturbance were also pertinent to this visit.                           Stephanie Parkinson MD  07/08/17 9817

## 2017-07-08 PROBLEM — I50.41 ACUTE COMBINED SYSTOLIC AND DIASTOLIC HEART FAILURE: Status: ACTIVE | Noted: 2017-01-01

## 2017-07-08 PROBLEM — I34.0 NON-RHEUMATIC MITRAL REGURGITATION: Status: ACTIVE | Noted: 2017-01-01

## 2017-07-08 NOTE — ASSESSMENT & PLAN NOTE
7/8/17: Discussions with family about possibly pursuing comfort measures only and hospice again.   7/7/17: Wife says that she wants him to be comfortable and to not be in any pain. She does not want him to be resuscitated in the event of cardiac arrest. No intubation or mechanical ventilation for any reason. Currently, okay with pressors and BiPAP. Will talk more with her and family over next day or two about re-establishing goals of care and going back to hospice.

## 2017-07-08 NOTE — ASSESSMENT & PLAN NOTE
Holding home namenda right now. Has been having delirium at home prior to this. Haloperidol prn.

## 2017-07-08 NOTE — PLAN OF CARE
Problem: Patient Care Overview  Goal: Plan of Care Review  Outcome: Ongoing (interventions implemented as appropriate)  Pt. Is a bit short of breath.  Will continue to monitor pt.

## 2017-07-08 NOTE — ASSESSMENT & PLAN NOTE
Due to worsening dementia. Does not seem that family would want feeding tube if it came to that. Monitor.

## 2017-07-08 NOTE — SUBJECTIVE & OBJECTIVE
Past Medical History:   Diagnosis Date    Aortic stenosis     BPH (benign prostatic hypertrophy)     Dementia     Diabetes mellitus type II     Hyperlipidemia     MGUS (monoclonal gammopathy of unknown significance)     PVD (peripheral vascular disease)        Past Surgical History:   Procedure Laterality Date    APPENDECTOMY      CHOLECYSTECTOMY      SHOULDER SURGERY      right and left, decrease ROM of left.       Review of patient's allergies indicates:   Allergen Reactions    Meperidine      Other reaction(s): Unknown       No current facility-administered medications on file prior to encounter.      Current Outpatient Prescriptions on File Prior to Encounter   Medication Sig    ACCU-CHEK COMFORT CURVE TEST Strp TEST 3 TO 4 TIMES DAILY (Patient taking differently: Once a week)    ACCU-CHEK SOFTCLIX LANCETS Misc USE THREE TIMES A DAY (Patient taking differently: Once a week)    albuterol (PROVENTIL HFA) 90 mcg/actuation HFAA Inhale 2 puffs into the lungs every 4 to 6 hours as needed.    amlodipine (NORVASC) 10 MG tablet Take 1 tablet (10 mg total) by mouth once daily.    citalopram (CELEXA) 20 MG tablet TAKE ONE TABLET BY MOUTH AT BEDTIME FOR DEPRESSION    finasteride (PROSCAR) 5 mg tablet TAKE ONE TABLET BY MOUTH EVERY DAY    furosemide (LASIX) 20 MG tablet Take 1 tablet (20 mg total) by mouth daily as needed.    galantamine (RAZADYNE ER) 16 MG 24 hr capsule Take 1 capsule (16 mg total) by mouth once daily.    ketoconazole (NIZORAL) 2 % cream APPLY TO AFFECTED AREA(S) ONCE DAILY    losartan (COZAAR) 100 MG tablet Take 1 tablet (100 mg total) by mouth once daily.    memantine (NAMENDA) 10 MG Tab Take 1 tablet (10 mg total) by mouth 2 (two) times daily.    multivitamin-minerals-lutein (CENTRUM SILVER) Tab 1 Tablet Oral Every day    mupirocin (BACTROBAN) 2 % ointment     omega-3 fatty acids-vitamin E (FISH OIL) 1,000 mg Cap 1 Capsule Oral Every day    omeprazole (PRILOSEC) 20 MG capsule  TAKE ONE CAPSULE BY MOUTH TWICE A DAY    simvastatin (ZOCOR) 20 MG tablet TAKE ONE TABLET BY MOUTH EVERY DAY    tamsulosin (FLOMAX) 0.4 mg Cp24 Take 1 capsule (0.4 mg total) by mouth once daily.    tramadol (ULTRAM) 50 mg tablet Take 1 tablet (50 mg total) by mouth every 12 (twelve) hours as needed for Pain.    triamcinolone acetonide 0.1% (KENALOG) 0.1 % cream     [DISCONTINUED] sulfamethoxazole-trimethoprim 800-160mg (BACTRIM DS) 800-160 mg Tab      Family History     Problem Relation (Age of Onset)    Macular degeneration Mother        Social History Main Topics    Smoking status: Former Smoker     Types: Cigarettes, Pipe, Cigars    Smokeless tobacco: Never Used    Alcohol use No    Drug use: No    Sexual activity: Not on file     Review of Systems   Unable to perform ROS: Dementia     Objective:     Vital Signs (Most Recent):  Temp: 97.5 °F (36.4 °C) (07/07/17 1800)  Pulse: 97 (07/07/17 1800)  Resp: (!) 26 (07/07/17 1800)  BP: (!) 85/62 (07/07/17 1830)  SpO2: 100 % (07/07/17 1800) Vital Signs (24h Range):  Temp:  [97.5 °F (36.4 °C)-98.8 °F (37.1 °C)] 97.5 °F (36.4 °C)  Pulse:  [] 97  Resp:  [20-31] 26  SpO2:  [96 %-100 %] 100 %  BP: ()/(49-67) 85/62     Weight: 85.1 kg (187 lb 9.8 oz)  Body mass index is 22.84 kg/m².    Physical Exam   Constitutional: He appears well-developed. He is cooperative. He appears distressed.   HENT:   Head: Normocephalic and atraumatic.   Right Ear: External ear normal.   Left Ear: External ear normal.   Nose: No mucosal edema or sinus tenderness.   Mouth/Throat: Uvula is midline and mucous membranes are normal.   Eyes: Conjunctivae and EOM are normal. Pupils are equal, round, and reactive to light. No scleral icterus.   Neck: Neck supple. No JVD present. No muscular tenderness present. Carotid bruit is not present. No tracheal deviation present.   Cardiovascular: S1 normal and S2 normal.  An irregularly irregular rhythm present. Tachycardia present.    Murmur  heard.  Pulses:       Radial pulses are 2+ on the right side, and 2+ on the left side.   Pulmonary/Chest: Tachypnea noted. He is in respiratory distress. He has decreased breath sounds. He has wheezes. He has no rales. He exhibits no tenderness and no crepitus.   Abdominal: Soft. Bowel sounds are normal. He exhibits no distension. There is no tenderness. There is no rebound and no guarding.   Musculoskeletal: He exhibits no edema or tenderness.   Lymphadenopathy:        Right cervical: No superficial cervical adenopathy present.       Left cervical: No superficial cervical adenopathy present.        Right: No supraclavicular adenopathy present.        Left: No supraclavicular adenopathy present.   Neurological: He is alert. He is disoriented. He displays no tremor. He displays no seizure activity.   Oriented to person, but not place or time. Follows some simple commands.   Skin: Skin is warm and dry. He is not diaphoretic. No cyanosis or erythema. No pallor. Nails show no clubbing.   Multiple skin tears, left elbow wrapped with blood strikethrough on the kerlix   Nursing note and vitals reviewed.       Significant Labs:   ABGs:   Recent Labs  Lab 07/07/17  1403   PH 7.234*   PCO2 47.5*   HCO3 20.1*   POCSATURATED 40*   BE -7     CBC:   Recent Labs  Lab 07/07/17  1332 07/07/17  1403   WBC 9.00  --    HGB 12.9*  --    HCT 38.9* 37     --      CMP:   Recent Labs  Lab 07/07/17  1332   *   K 3.7   CL 93*   CO2 14*   *   BUN 27*   CREATININE 1.4   CALCIUM 9.0   PROT 6.1   ALBUMIN 2.8*   BILITOT 0.6   ALKPHOS 54*   *   ALT 27   ANIONGAP 22*   EGFRNONAA 45*     Cardiac Markers:   Recent Labs  Lab 07/07/17  1332   BNP 10     Coagulation:   Recent Labs  Lab 07/07/17  1332   INR 1.1   APTT 27.1     Lactic Acid:   Recent Labs  Lab 07/07/17  1332 07/07/17  1552   LACTATE >12.0* 7.5*     Troponin:   Recent Labs  Lab 07/07/17  1332 07/07/17  1552 07/07/17  1808   TROPONINI 16.415* 14.112* 14.483*   14.483*     TSH:   Recent Labs  Lab 07/07/17  1332   TSH 2.974     Urine Studies:   Recent Labs  Lab 07/07/17  1406   COLORU Brown*   APPEARANCEUA Clear   PHUR 6.0   SPECGRAV 1.025   PROTEINUA Trace*   GLUCUA Negative   KETONESU Negative   BILIRUBINUA Negative   OCCULTUA Negative   NITRITE Negative   UROBILINOGEN Negative   LEUKOCYTESUR Negative       Significant Imaging: CXR: I have reviewed all pertinent results/findings within the past 24 hours and my personal findings are:  Possible small retrocardiac infiltrate  EKG: I have reviewed all pertinent results/findings within the past 24 hours and my personal findings are: atrial fibrillation with RVR with lateral TWI, no ST changes

## 2017-07-08 NOTE — PLAN OF CARE
Problem: Patient Care Overview  Goal: Plan of Care Review  Outcome: Ongoing (interventions implemented as appropriate)  Received pt on BIPAP; placed on HFNC per MD order. Tolerated tx well; will cont to monitor. Pt on 15L/80%; SAT 94-96%.

## 2017-07-08 NOTE — ASSESSMENT & PLAN NOTE
Lactic acidosis  NSTEMI  Acute respiratory failure with hypoxia  Acute systolic/diastolic heart failure  Severe aortic stenosis/Mild to moderate mitral regurgitation  Continue on ASA. Off levophed since early this AM. Continue HFNC for oxygenation. A/A nebs. Troponin trended up a little toady. Echocardiogram shows multiple abnormalities with new heart failure and multiple valvular issues. Spoke with family again today about the current status and our limited abilities for intervention. They understand that and are contemplating comfort measures only. Can give low dose morphine for air hunger. Will give a dose of lasix to see

## 2017-07-08 NOTE — PLAN OF CARE
Pt confused, at baseline. Assessment obtained from wife via phone, Tory Rayo [phone]    Pt was on home hospice, [facility], upon admit, they picked up all HME    Hospice was arranged through pt's PCP and family had no choice choosing the agency. She was not happy with them and would like info on other Hospice agencies, inhouse as well as home hospice    Pt has no HME currently at home and no HH    TN left Senior Resource Guide in pt's rm in ICU for wife when she comes back to hospital       07/08/17 1159   Discharge Assessment   Assessment Type Discharge Planning Assessment   Confirmed/corrected address and phone number on facesheet? Yes   Assessment information obtained from? Caregiver  (wife:  Tory Rayo  [phone])   Expected Length of Stay (days) 3   Communicated expected length of stay with patient/caregiver yes   Prior to hospitilization cognitive status: Not Oriented to Place;Not Oriented to Time   Prior to hospitalization functional status: Completely Dependent   Current cognitive status: Not Oriented to Place;Not Oriented to Time   Current Functional Status: Completely Dependent   Arrived From hospice/home  (was with [facility]  [phone])   Lives With spouse   Able to Return to Prior Arrangements unable to determine at this time (comments)   Is patient able to care for self after discharge? No   How many people do you have in your home that can help with your care after discharge? 1   Who are your caregiver(s) and their phone number(s)? wife:  Tory Rayo  [phone]   Patient's perception of discharge disposition hospice/home   Readmission Within The Last 30 Days no previous admission in last 30 days   Patient currently being followed by outpatient case management? No   Patient currently receives home health services? No   Does the patient currently use HME? Yes   Name and contact number for HME provider: was with [facility]  [phone]   Patient currently receives private duty nursing? No   Patient currently receives any other outside agency services? No   Equipment Currently Used at Home other (see comments)  (was with [facility]  [phone]---upon admission, company picked up all HME from pt's home)   Do you have any problems affording any of your prescribed medications? No   Is the patient taking medications as prescribed? yes   Do you have any financial concerns preventing you from receiving the healthcare you need? No   Does the patient have transportation to healthcare appointments? Yes   Transportation Available family or friend will provide   On Dialysis? No   Does the patient receive services at the Coumadin Clinic? No   Are there any open cases? No   Discharge Plan A Hospice/home   Discharge Plan B Inpatient Hospice   Patient/Family In Agreement With Plan yes     Stephanie Wells RN Transitional Navigator  [phone]

## 2017-07-08 NOTE — ASSESSMENT & PLAN NOTE
Lactic acidosis  NSTEMI  Acute respiratory failure with hypoxia  Will start on ASA. Continue levophed for now. Continue BiPAP, but can try to transition to HFNC for oxygenation. A/A nebs. Trend troponin. Check echocardiogram. Spoke with family about full treatment with DAPT and anticoagulation. Grandtasia did not think that full medical treatment would be good as he is already bleeding from some skin tears. Will not consult Cardiology at this time as we are doing very conservative care. No beta-blocker because of low blood pressure.

## 2017-07-08 NOTE — ASSESSMENT & PLAN NOTE
Wife says that she wants him to be comfortable and to not be in any pain. She does not want him to be resuscitated in the event of cardiac arrest. No intubation or mechanical ventilation for any reason. Currently, okay with pressors and BiPAP. Will talk more with her and family over next day or two about re-establishing goals of care and going back to hospice.

## 2017-07-08 NOTE — HPI
Mr. Rayo is an 86 yo WM with advanced dementia with behavioral disturbance, HTN, and HLD that presented to [facility] for evaluation of respiratory distress. He is not able to provide history because of BiPAP and dementia. His wife says that he has been having increased delirium the last couple weeks, but was much worse the last couple days. He usually got this way when he had a UTI, so he was started on ciprofloxacin 2 days ago. Yesterday, he started having some breathing difficulties, but this AM he had a difficult time catching his breath. He had not been complaining of anything in particular and had not had a fever or cough recently. His wife did note that he was having some shaking/tremor and reported being cold the last couple days.     His wife says that he has been declining more over the last few months. He is not eating as much and has become weaker. He was not sitting up the last few days to eat. She says that he has been on hospice at home for close to a year now.

## 2017-07-08 NOTE — PROGRESS NOTES
Family has opted to focus on more comfort care at this time. Will stop the IV fluids. Stop the ASA and lovenox. Will continue the antibiotics for the short term. Will give morphine 1 mg IV q 2 hours prn. Haldoperidol 5 mg IV q6 hrs prn. Lorazepam 1 mg oral solution q2 hrs prn. Will continue A/A nebs.     Ronak Watt MD, [facility]  Staff Hospitalist  Pager: [phone]  Spectralink: [phone]

## 2017-07-08 NOTE — SUBJECTIVE & OBJECTIVE
Interval History: Switched over to HFNC overnight. Denies any SOB or chest pain this AM. Did not sleep well overnight.     Review of Systems   Constitutional: Negative for fever.   Respiratory: Negative for cough and shortness of breath.    Cardiovascular: Negative for chest pain.   Gastrointestinal: Negative for nausea and vomiting.     Objective:     Vital Signs (Most Recent):  Temp: 98.5 °F (36.9 °C) (07/08/17 1115)  Pulse: 103 (07/08/17 1330)  Resp: (!) 27 (07/08/17 1330)  BP: 97/65 (07/08/17 1315)  SpO2: (!) 92 % (07/08/17 1330) Vital Signs (24h Range):  Temp:  [96.1 °F (35.6 °C)-98.5 °F (36.9 °C)] 98.5 °F (36.9 °C)  Pulse:  [] 103  Resp:  [22-58] 27  SpO2:  [81 %-100 %] 92 %  BP: ()/(32-95) 97/65     Weight: 85.1 kg (187 lb 9.8 oz)  Body mass index is 25.44 kg/m².    Intake/Output Summary (Last 24 hours) at 07/08/17 1508  Last data filed at 07/08/17 1200   Gross per 24 hour   Intake           2292.5 ml   Output              450 ml   Net           1842.5 ml      Physical Exam   Constitutional: He appears well-developed and well-nourished. No distress.   HENT:   Head: Normocephalic and atraumatic.   Cardiovascular: An irregularly irregular rhythm present. Tachycardia present.    Murmur heard.  Pulmonary/Chest: Tachypnea noted. He is in respiratory distress. He has rhonchi. He has rales. He exhibits no tenderness and no crepitus.   Abdominal: Soft. Bowel sounds are decreased. There is no tenderness.   Musculoskeletal: He exhibits edema. He exhibits no tenderness.   Neurological: He is alert. He displays no tremor.   Oriented to person and being at a hospital   Psychiatric: He has a normal mood and affect. His behavior is normal.   Nursing note and vitals reviewed.      Significant Labs:   Blood Culture:   Recent Labs  Lab 07/07/17  1340 07/07/17  1345   LABBLOO No Growth to date No Growth to date     CBC:   Recent Labs  Lab 07/07/17  1332 07/07/17  1403 07/08/17  0313   WBC 9.00  --  16.16*   HGB  12.9*  --  13.2*   HCT 38.9* 37 37.6*     --  230     CMP:   Recent Labs  Lab 07/07/17  1332 07/07/17 2111 07/08/17  0313   * 127* 127*   K 3.7 4.2 4.4   CL 93* 96 98   CO2 14* 16* 17*   * 319* 272*   BUN 27* 31* 34*   CREATININE 1.4 1.4 1.3   CALCIUM 9.0 8.9 9.0   PROT 6.1  --  6.1   ALBUMIN 2.8*  --  2.6*   BILITOT 0.6  --  0.6   ALKPHOS 54*  --  49*   *  --  122*   ALT 27  --  32   ANIONGAP 22* 15 12   EGFRNONAA 45* 45* 49*     Magnesium:   Recent Labs  Lab 07/07/17  1332 07/08/17  0313   MG 2.1 2.3     Troponin:   Recent Labs  Lab 07/07/17  1808 07/07/17 2111 07/08/17  0313   TROPONINI 14.483*  14.483* 16.269* 19.005*       Significant Imaging: I have reviewed all pertinent imaging results/findings within the past 24 hours.   Echocardiogram: Severely depressed left ventricular systolic function (EF 20-25%). Severe aortic stenosis, DANIELLE = 0.56 cm2, peak velocity = 3.36 m/s, mean gradient = 30 mmHg. Impaired LV relaxation, elevated LAP (grade 2 diastolic dysfunction). Pulmonary hypertension. The estimated PA systolic pressure is 41 mmHg. Normal right ventricular systolic function. Severe left atrial enlargement. Mild to moderate mitral regurgitation. Mild aortic regurgitation.

## 2017-07-08 NOTE — PROGRESS NOTES
[facility] Hospital Medicine  Progress Note    Patient Name: Jonathan Rayo Jr.  MRN: [id number]  Patient Class: IP- Inpatient   Admission Date: 7/7/2017  Length of Stay: 1 days  Attending Physician: Ronak Watt MD  Primary Care Provider: Chente Becerra MD        Subjective:     Principal Problem:Cardiogenic shock    HPI:  Mr. Rayo is an 86 yo WM with advanced dementia with behavioral disturbance, HTN, and HLD that presented to [facility] for evaluation of respiratory distress. He is not able to provide history because of BiPAP and dementia. His wife says that he has been having increased delirium the last couple weeks, but was much worse the last couple days. He usually got this way when he had a UTI, so he was started on ciprofloxacin 2 days ago. Yesterday, he started having some breathing difficulties, but this AM he had a difficult time catching his breath. He had not been complaining of anything in particular and had not had a fever or cough recently. His wife did note that he was having some shaking/tremor and reported being cold the last couple days.     His wife says that he has been declining more over the last few months. He is not eating as much and has become weaker. He was not sitting up the last few days to eat. She says that he has been on hospice at home for close to a year now.     Hospital Course:  No notes on file    Interval History: Switched over to HFNC overnight. Denies any SOB or chest pain this AM. Did not sleep well overnight.     Review of Systems   Constitutional: Negative for fever.   Respiratory: Negative for cough and shortness of breath.    Cardiovascular: Negative for chest pain.   Gastrointestinal: Negative for nausea and vomiting.     Objective:     Vital Signs (Most Recent):  Temp: 98.5 °F (36.9 °C) (07/08/17 1115)  Pulse: 103 (07/08/17 1330)  Resp: (!) 27 (07/08/17 1330)  BP: 97/65 (07/08/17 1315)  SpO2: (!) 92 % (07/08/17 1330) Vital Signs (24h  Range):  Temp:  [96.1 °F (35.6 °C)-98.5 °F (36.9 °C)] 98.5 °F (36.9 °C)  Pulse:  [] 103  Resp:  [22-58] 27  SpO2:  [81 %-100 %] 92 %  BP: ()/(32-95) 97/65     Weight: 85.1 kg (187 lb 9.8 oz)  Body mass index is 25.44 kg/m².    Intake/Output Summary (Last 24 hours) at 07/08/17 1508  Last data filed at 07/08/17 1200   Gross per 24 hour   Intake           2292.5 ml   Output              450 ml   Net           1842.5 ml      Physical Exam   Constitutional: He appears well-developed and well-nourished. No distress.   HENT:   Head: Normocephalic and atraumatic.   Cardiovascular: An irregularly irregular rhythm present. Tachycardia present.    Murmur heard.  Pulmonary/Chest: Tachypnea noted. He is in respiratory distress. He has rhonchi. He has rales. He exhibits no tenderness and no crepitus.   Abdominal: Soft. Bowel sounds are decreased. There is no tenderness.   Musculoskeletal: He exhibits edema. He exhibits no tenderness.   Neurological: He is alert. He displays no tremor.   Oriented to person and being at a hospital   Psychiatric: He has a normal mood and affect. His behavior is normal.   Nursing note and vitals reviewed.      Significant Labs:   Blood Culture:   Recent Labs  Lab 07/07/17  1340 07/07/17  1345   LABBLOO No Growth to date No Growth to date     CBC:   Recent Labs  Lab 07/07/17  1332 07/07/17  1403 07/08/17  0313   WBC 9.00  --  16.16*   HGB 12.9*  --  13.2*   HCT 38.9* 37 37.6*     --  230     CMP:   Recent Labs  Lab 07/07/17  1332 07/07/17  2111 07/08/17  0313   * 127* 127*   K 3.7 4.2 4.4   CL 93* 96 98   CO2 14* 16* 17*   * 319* 272*   BUN 27* 31* 34*   CREATININE 1.4 1.4 1.3   CALCIUM 9.0 8.9 9.0   PROT 6.1  --  6.1   ALBUMIN 2.8*  --  2.6*   BILITOT 0.6  --  0.6   ALKPHOS 54*  --  49*   *  --  122*   ALT 27  --  32   ANIONGAP 22* 15 12   EGFRNONAA 45* 45* 49*     Magnesium:   Recent Labs  Lab 07/07/17  1332 07/08/17  0313   MG 2.1 2.3     Troponin:   Recent  Labs  Lab 07/07/17  1808 07/07/17  2111 07/08/17  0313   TROPONINI 14.483*  14.483* 16.269* 19.005*       Significant Imaging: I have reviewed all pertinent imaging results/findings within the past 24 hours.   Echocardiogram: Severely depressed left ventricular systolic function (EF 20-25%). Severe aortic stenosis, DANIELLE = 0.56 cm2, peak velocity = 3.36 m/s, mean gradient = 30 mmHg. Impaired LV relaxation, elevated LAP (grade 2 diastolic dysfunction). Pulmonary hypertension. The estimated PA systolic pressure is 41 mmHg. Normal right ventricular systolic function. Severe left atrial enlargement. Mild to moderate mitral regurgitation. Mild aortic regurgitation.     Assessment/Plan:      * Cardiogenic shock    Lactic acidosis  NSTEMI  Acute respiratory failure with hypoxia  Acute systolic/diastolic heart failure  Severe aortic stenosis/Mild to moderate mitral regurgitation  Continue on ASA. Off levophed since early this AM. Continue HFNC for oxygenation. A/A nebs. Troponin trended up a little toady. Echocardiogram shows multiple abnormalities with new heart failure and multiple valvular issues. Spoke with family again today about the current status and our limited abilities for intervention. They understand that and are contemplating comfort measures only. Can give low dose morphine for air hunger. Will give a dose of lasix to see         DNR (do not resuscitate)    7/8/17: Discussions with family about possibly pursuing comfort measures only and hospice again.   7/7/17: Wife says that she wants him to be comfortable and to not be in any pain. She does not want him to be resuscitated in the event of cardiac arrest. No intubation or mechanical ventilation for any reason. Currently, okay with pressors and BiPAP. Will talk more with her and family over next day or two about re-establishing goals of care and going back to hospice.         LORI (acute kidney injury)    Monitor with IV fluids. Creatinine is down to 1.3 today.  Monitor UOP.           Anemia of chronic disease    Monitor hemoglobin. Stable.           Malnutrition of moderate degree    Due to worsening dementia. Does not seem that family would want feeding tube if it came to that. Monitor.           Hyponatremia    Monitor with IV fluids.           Diabetes mellitus without complication    Accuchecks with SSI. A1c 5.5.           Late onset Alzheimer's disease with behavioral disturbance    Holding home namenda right now. Has been having delirium at home prior to this. Haloperidol prn.             VTE Risk Mitigation         Ordered     enoxaparin injection 40 mg  Daily     Route:  Subcutaneous        07/07/17 1927     Medium Risk of VTE  Once      07/07/17 1927        Critical Care Time: 45 Minutes of critical care time was spent in the care of the patient. This included management of organ failures related to critical illness, titration of continuous infusions, review of pertinent labs and imaging studies, discussion of the patient with consulting services, and discussion of the patients condition with the patient and family.         Ronak Watt MD  Department of Hospital Medicine   [facility]

## 2017-07-08 NOTE — PLAN OF CARE
Problem: Patient Care Overview  Goal: Plan of Care Review  Outcome: Ongoing (interventions implemented as appropriate)  Repositioning q2 hours to avoid pressure injury. Urine output low, DrPao Aware. HR elevated. Remains hypotensive, no longer on pressors. Family has decided to pursue comfort measures. Transfer orders to floor, awaiting bed. Requiring HFNC for comfort and to maintain sats. Morphine and Haladol PRN, see MAR.

## 2017-07-08 NOTE — H&P
[facility] Hospital Medicine  History & Physical    Patient Name: Jonathan Rayo Jr.  MRN: [id number]  Admission Date: 7/7/2017  Attending Physician: Ronak Watt MD  Primary Care Provider: Chente Becerra MD         Patient information was obtained from patient, spouse/SO, relative(s), past medical records and ER records.     Subjective:     Principal Problem:Cardiogenic shock    Chief Complaint:   Chief Complaint   Patient presents with    Shortness of Breath     sob since yesterday, arrived on CPAP via EMS, patient on hospice        HPI: Mr. Rayo is an 86 yo WM with advanced dementia with behavioral disturbance, HTN, and HLD that presented to [facility] for evaluation of respiratory distress. He is not able to provide history because of BiPAP and dementia. His wife says that he has been having increased delirium the last couple weeks, but was much worse the last couple days. He usually got this way when he had a UTI, so he was started on ciprofloxacin 2 days ago. Yesterday, he started having some breathing difficulties, but this AM he had a difficult time catching his breath. He had not been complaining of anything in particular and had not had a fever or cough recently. His wife did note that he was having some shaking/tremor and reported being cold the last couple days.     His wife says that he has been declining more over the last few months. He is not eating as much and has become weaker. He was not sitting up the last few days to eat. She says that he has been on hospice at home for close to a year now.     Past Medical History:   Diagnosis Date    Aortic stenosis     BPH (benign prostatic hypertrophy)     Dementia     Diabetes mellitus type II     Hyperlipidemia     MGUS (monoclonal gammopathy of unknown significance)     PVD (peripheral vascular disease)        Past Surgical History:   Procedure Laterality Date    APPENDECTOMY      CHOLECYSTECTOMY      SHOULDER SURGERY       right and left, decrease ROM of left.       Review of patient's allergies indicates:   Allergen Reactions    Meperidine      Other reaction(s): Unknown       No current facility-administered medications on file prior to encounter.      Current Outpatient Prescriptions on File Prior to Encounter   Medication Sig    ACCU-CHEK COMFORT CURVE TEST Strp TEST 3 TO 4 TIMES DAILY (Patient taking differently: Once a week)    ACCU-CHEK SOFTCLIX LANCETS Misc USE THREE TIMES A DAY (Patient taking differently: Once a week)    albuterol (PROVENTIL HFA) 90 mcg/actuation HFAA Inhale 2 puffs into the lungs every 4 to 6 hours as needed.    amlodipine (NORVASC) 10 MG tablet Take 1 tablet (10 mg total) by mouth once daily.    citalopram (CELEXA) 20 MG tablet TAKE ONE TABLET BY MOUTH AT BEDTIME FOR DEPRESSION    finasteride (PROSCAR) 5 mg tablet TAKE ONE TABLET BY MOUTH EVERY DAY    furosemide (LASIX) 20 MG tablet Take 1 tablet (20 mg total) by mouth daily as needed.    galantamine (RAZADYNE ER) 16 MG 24 hr capsule Take 1 capsule (16 mg total) by mouth once daily.    ketoconazole (NIZORAL) 2 % cream APPLY TO AFFECTED AREA(S) ONCE DAILY    losartan (COZAAR) 100 MG tablet Take 1 tablet (100 mg total) by mouth once daily.    memantine (NAMENDA) 10 MG Tab Take 1 tablet (10 mg total) by mouth 2 (two) times daily.    multivitamin-minerals-lutein (CENTRUM SILVER) Tab 1 Tablet Oral Every day    mupirocin (BACTROBAN) 2 % ointment     omega-3 fatty acids-vitamin E (FISH OIL) 1,000 mg Cap 1 Capsule Oral Every day    omeprazole (PRILOSEC) 20 MG capsule TAKE ONE CAPSULE BY MOUTH TWICE A DAY    simvastatin (ZOCOR) 20 MG tablet TAKE ONE TABLET BY MOUTH EVERY DAY    tamsulosin (FLOMAX) 0.4 mg Cp24 Take 1 capsule (0.4 mg total) by mouth once daily.    tramadol (ULTRAM) 50 mg tablet Take 1 tablet (50 mg total) by mouth every 12 (twelve) hours as needed for Pain.    triamcinolone acetonide 0.1% (KENALOG) 0.1 % cream      [DISCONTINUED] sulfamethoxazole-trimethoprim 800-160mg (BACTRIM DS) 800-160 mg Tab      Family History     Problem Relation (Age of Onset)    Macular degeneration Mother        Social History Main Topics    Smoking status: Former Smoker     Types: Cigarettes, Pipe, Cigars    Smokeless tobacco: Never Used    Alcohol use No    Drug use: No    Sexual activity: Not on file     Review of Systems   Unable to perform ROS: Dementia     Objective:     Vital Signs (Most Recent):  Temp: 97.5 °F (36.4 °C) (07/07/17 1800)  Pulse: 97 (07/07/17 1800)  Resp: (!) 26 (07/07/17 1800)  BP: (!) 85/62 (07/07/17 1830)  SpO2: 100 % (07/07/17 1800) Vital Signs (24h Range):  Temp:  [97.5 °F (36.4 °C)-98.8 °F (37.1 °C)] 97.5 °F (36.4 °C)  Pulse:  [] 97  Resp:  [20-31] 26  SpO2:  [96 %-100 %] 100 %  BP: ()/(49-67) 85/62     Weight: 85.1 kg (187 lb 9.8 oz)  Body mass index is 22.84 kg/m².    Physical Exam   Constitutional: He appears well-developed. He is cooperative. He appears distressed.   HENT:   Head: Normocephalic and atraumatic.   Right Ear: External ear normal.   Left Ear: External ear normal.   Nose: No mucosal edema or sinus tenderness.   Mouth/Throat: Uvula is midline and mucous membranes are normal.   Eyes: Conjunctivae and EOM are normal. Pupils are equal, round, and reactive to light. No scleral icterus.   Neck: Neck supple. No JVD present. No muscular tenderness present. Carotid bruit is not present. No tracheal deviation present.   Cardiovascular: S1 normal and S2 normal.  An irregularly irregular rhythm present. Tachycardia present.    Murmur heard.  Pulses:       Radial pulses are 2+ on the right side, and 2+ on the left side.   Pulmonary/Chest: Tachypnea noted. He is in respiratory distress. He has decreased breath sounds. He has wheezes. He has no rales. He exhibits no tenderness and no crepitus.   Abdominal: Soft. Bowel sounds are normal. He exhibits no distension. There is no tenderness. There is no  rebound and no guarding.   Musculoskeletal: He exhibits no edema or tenderness.   Lymphadenopathy:        Right cervical: No superficial cervical adenopathy present.       Left cervical: No superficial cervical adenopathy present.        Right: No supraclavicular adenopathy present.        Left: No supraclavicular adenopathy present.   Neurological: He is alert. He is disoriented. He displays no tremor. He displays no seizure activity.   Oriented to person, but not place or time. Follows some simple commands.   Skin: Skin is warm and dry. He is not diaphoretic. No cyanosis or erythema. No pallor. Nails show no clubbing.   Multiple skin tears, left elbow wrapped with blood strikethrough on the kerlix   Nursing note and vitals reviewed.       Significant Labs:   ABGs:   Recent Labs  Lab 07/07/17  1403   PH 7.234*   PCO2 47.5*   HCO3 20.1*   POCSATURATED 40*   BE -7     CBC:   Recent Labs  Lab 07/07/17  1332 07/07/17  1403   WBC 9.00  --    HGB 12.9*  --    HCT 38.9* 37     --      CMP:   Recent Labs  Lab 07/07/17  1332   *   K 3.7   CL 93*   CO2 14*   *   BUN 27*   CREATININE 1.4   CALCIUM 9.0   PROT 6.1   ALBUMIN 2.8*   BILITOT 0.6   ALKPHOS 54*   *   ALT 27   ANIONGAP 22*   EGFRNONAA 45*     Cardiac Markers:   Recent Labs  Lab 07/07/17  1332   BNP 10     Coagulation:   Recent Labs  Lab 07/07/17  1332   INR 1.1   APTT 27.1     Lactic Acid:   Recent Labs  Lab 07/07/17  1332 07/07/17  1552   LACTATE >12.0* 7.5*     Troponin:   Recent Labs  Lab 07/07/17  1332 07/07/17  1552 07/07/17  1808   TROPONINI 16.415* 14.112* 14.483*  14.483*     TSH:   Recent Labs  Lab 07/07/17  1332   TSH 2.974     Urine Studies:   Recent Labs  Lab 07/07/17  1406   COLORU Brown*   APPEARANCEUA Clear   PHUR 6.0   SPECGRAV 1.025   PROTEINUA Trace*   GLUCUA Negative   KETONESU Negative   BILIRUBINUA Negative   OCCULTUA Negative   NITRITE Negative   UROBILINOGEN Negative   LEUKOCYTESUR Negative       Significant Imaging:  CXR: I have reviewed all pertinent results/findings within the past 24 hours and my personal findings are:  Possible small retrocardiac infiltrate  EKG: I have reviewed all pertinent results/findings within the past 24 hours and my personal findings are: atrial fibrillation with RVR with lateral TWI, no ST changes    Assessment/Plan:     * Cardiogenic shock    Lactic acidosis  NSTEMI  Acute respiratory failure with hypoxia  Will start on ASA. Continue levophed for now. Continue BiPAP, but can try to transition to HFNC for oxygenation. A/A nebs. Trend troponin. Check echocardiogram. Spoke with family about full treatment with DAPT and anticoagulation. Grandson did not think that full medical treatment would be good as he is already bleeding from some skin tears. Will not consult Cardiology at this time as we are doing very conservative care. No beta-blocker because of low blood pressure.         DNR (do not resuscitate)    Wife says that she wants him to be comfortable and to not be in any pain. She does not want him to be resuscitated in the event of cardiac arrest. No intubation or mechanical ventilation for any reason. Currently, okay with pressors and BiPAP. Will talk more with her and family over next day or two about re-establishing goals of care and going back to hospice.         LORI (acute kidney injury)    Monitor with IV fluids and pressors for hypotension. UA is bland. Monitor UOP.           Anemia of chronic disease    Monitor hemoglobin.           Malnutrition of moderate degree    Due to worsening dementia. Does not seem that family would want feeding tube if it came to that. Monitor.           Hyponatremia    Monitor with IV fluids.           Diabetes mellitus without complication    Accuchecks with SSI. Check A1c.           Late onset Alzheimer's disease with behavioral disturbance    Holding home namenda right now. Has been having delirium at home prior to this. Haloperidol prn.             VTE Risk  Mitigation         Ordered     enoxaparin injection 40 mg  Daily     Route:  Subcutaneous        07/07/17 1927     Medium Risk of VTE  Once      07/07/17 1927        Critical Care Time: 45 Minutes of critical care time was spent in the care of the patient. This included management of organ failures related to critical illness, titration of continuous infusions, review of pertinent labs and imaging studies, discussion of the patient with consulting services, and discussion of the patients condition with the patient and family.     Ronak Watt MD  Department of Hospital Medicine   [facility]

## 2017-07-08 NOTE — PROGRESS NOTES
Pharmacokinetics Pharmacy Protocol   WBC 9.0 , Scr 1.4, Crcl 40.8  HT: 72, WT: 85.1, afebrile.  Indication: pneumonia   Current antibiotics: cefepime 1gm q8 and vancomycin 1500mg q12, 1qm vanco in ED and 1 gm cefepime ED  Based on their pharmacokinetics/protocol, reduce cefepime to q12 and load additional vanco 500 mg x1 dose now, then continue vancomycin 1250 mg q24 to obtain a trough of 15-20.  Trough to be drawn prior to 4th dose on 7-10 @ 20:00    Pharmacy will continue to follow.

## 2017-07-08 NOTE — PLAN OF CARE
Patient's daughter-in-law, Nadine, called to state that family would like patient to receive first dose of morphine, and to be transferred to the floor whenever possible. Family would like patient to receive comfort measures. Dr. Watt notified. New orders received.

## 2017-07-09 NOTE — PLAN OF CARE
Problem: Patient Care Overview  Goal: Plan of Care Review  Outcome: Ongoing (interventions implemented as appropriate)  Received pt on HFNC 20L/90%. Tolerated tx well; will cont to monitor.

## 2017-07-09 NOTE — DISCHARGE SUMMARY
[facility] Medicine  Discharge Summary      Patient Name: Jonathan Rayo Jr.  MRN: [id number]  Admission Date: 7/7/2017  Hospital Length of Stay: 2 days  Discharge Date and Time:  07/09/2017 7:55 AM  Attending Physician: Ronak Watt MD   Discharging Provider: Ronak Watt MD  Primary Care Provider: Chente Becerra MD      HPI:   Mr. Rayo is an 88 yo WM with advanced dementia with behavioral disturbance, HTN, and HLD that presented to [facility] for evaluation of respiratory distress. He is not able to provide history because of BiPAP and dementia. His wife says that he has been having increased delirium the last couple weeks, but was much worse the last couple days. He usually got this way when he had a UTI, so he was started on ciprofloxacin 2 days ago. Yesterday, he started having some breathing difficulties, but this AM he had a difficult time catching his breath. He had not been complaining of anything in particular and had not had a fever or cough recently. His wife did note that he was having some shaking/tremor and reported being cold the last couple days.     His wife says that he has been declining more over the last few months. He is not eating as much and has become weaker. He was not sitting up the last few days to eat. She says that he has been on hospice at home for close to a year now.     * No surgery found *      Indwelling Lines/Drains at time of discharge:   Lines/Drains/Airways     Central Venous Catheter Line                 Percutaneous Central Line Insertion/Assessment - triple lumen  07/07/17 1613 right internal jugular 1 day          Drain                 Urethral Catheter 07/07/17 1419 Straight-tip 16 Fr. 1 day              Hospital Course:   Mr. Rayo was admitted to [facility] for continued care. He was found to be in cardiogenic shock from an NSTEMI with new acute systolic and diastolic heart failure and severe valvular abnormalities.  Discussions were had with his family and it was ultimately decided to proceed with a comfort care approach and not pursue continued aggressive treatments. He was on comfort flow oxygen and was given prn morphine for air hunger and pain. He was noted to be in asystole the morning of his death. He was pronounced dead by me at 0738 on 7/9/17.      Consults:     Significant Diagnostic Studies: Labs:   BMP:   Recent Labs  Lab 07/07/17  1332 07/07/17  2111 07/08/17 0313 07/09/17  0519   * 319* 272* 336*   * 127* 127* 131*   K 3.7 4.2 4.4 5.4*   CL 93* 96 98 99   CO2 14* 16* 17* 16*   BUN 27* 31* 34* 56*   CREATININE 1.4 1.4 1.3 2.6*   CALCIUM 9.0 8.9 9.0 9.5   MG 2.1  --  2.3 2.8*   , CBC   Recent Labs  Lab 07/07/17 1332  07/08/17 0313 07/09/17  0519   WBC 9.00  --  16.16* 16.39*   HGB 12.9*  --  13.2* 13.0*   HCT 38.9*  < > 37.6* 38.4*     --  230 264   < > = values in this interval not displayed. and Troponin   Recent Labs  Lab 07/08/17 0313   TROPONINI 19.005*     Cardiac Graphics: Echocardiogram:   2D echo with color flow doppler:   Results for orders placed or performed during the hospital encounter of 07/07/17   2D echo with color flow doppler   Result Value Ref Range    EF 20 (A) 55 - 65    Mitral Valve Regurgitation MILD TO MODERATE     Diastolic Dysfunction Yes (A)     Aortic Valve Regurgitation MILD     Aortic Valve Stenosis SEVERE (A)     Est. PA Systolic Pressure 41.18 (A)     Mitral Valve Mobility NORMAL     Tricuspid Valve Regurgitation MILD TO MODERATE        Pending Diagnostic Studies:     None        Final Active Diagnoses:    Diagnosis Date Noted POA    PRINCIPAL PROBLEM:  Cardiogenic shock [R57.0] 07/07/2017 Yes    DNR (do not resuscitate) [Z66] 07/07/2017 Yes    Non-rheumatic mitral regurgitation [I34.0] 07/08/2017 Yes    Acute combined systolic and diastolic heart failure [I50.41] 07/08/2017 Yes    NSTEMI (non-ST elevated myocardial infarction) [I21.4] 07/07/2017 Yes     Hyponatremia [E87.1] 2017 Yes    Acute respiratory failure with hypoxia [J96.01] 2017 Yes    Lactic acidosis [E87.2] 2017 Yes    Malnutrition of moderate degree [E44.0] 2017 Yes    Hyperglycemia [R73.9] 2017 Yes    Abnormal liver enzymes [R74.8] 2017 Yes    Anemia of chronic disease [D63.8] 2017 Yes    LORI (acute kidney injury) [N17.9] 2017 Yes    Persistent atrial fibrillation [I48.1] 09/15/2016 Yes    Diabetes mellitus without complication [E11.9]  Yes    PVD (peripheral vascular disease) [I73.9]  Yes    Severe aortic stenosis [I35.0]  Yes    Mixed hyperlipidemia [E78.2]  Yes    Late onset Alzheimer's disease with behavioral disturbance [G30.1, F02.81]  Yes    BPH (benign prostatic hypertrophy) [N40.0]  Yes      Problems Resolved During this Admission:    Diagnosis Date Noted Date Resolved POA      No new Assessment & Plan notes have been filed under this hospital service since the last note was generated.  Service: Hospital Medicine      Discharged Condition:     Disposition:     Follow Up:    Patient Instructions:   No discharge procedures on file.  Medications:  None (patient  at medical facility)  Time spent on the discharge of patient: 35 minutes    Ronak Watt MD  Department of Hospital Medicine  [facility]

## 2017-07-09 NOTE — SIGNIFICANT EVENT
Death Pronouncement    Patient is a DO NOT RESUSCITATE.  This was an anticipated event.    I was called to patients bedside to pronounce that patient Mr.George Girma Schuster has . No spontaneous movements were present. There was not response to verbal or tactile stimuli. Pupils were mid-dilated and fixed. No breath sounds were appreciated over either lung field. No carotid pulses were palpable. No heart sounds were auscultator over entire precordium. Patient pronounced dead at 0738 on 2017. Son was present at the bedside. Autopsy was offered and declined.     Time of Death: 0738  Cause of Death: Cardiogenic Shock    Ronak Watt MD

## 2017-07-09 NOTE — PLAN OF CARE
17 0837   Final Note   Assessment Type Final Discharge Note   Discharge Disposition      Stephanie Wells, RN Transitional Navigator  [phone]

## 2017-07-09 NOTE — HOSPITAL COURSE
Mr. Rayo was admitted to [facility] for continued care. He was found to be in cardiogenic shock from an NSTEMI with new acute systolic and diastolic heart failure and severe valvular abnormalities. Discussions were had with his family and it was ultimately decided to proceed with a comfort care approach and not pursue continued aggressive treatments. He was on comfort flow oxygen and was given prn morphine for air hunger and pain. He was noted to be in asystole the morning of his death. He was pronounced dead by me at 0738 on 7/9/17.

## 2017-07-10 LAB
BACTERIA BLD CULT: NORMAL

## 2017-07-14 LAB
BACTERIA BLD CULT: NORMAL

## 2017-07-14 NOTE — PHYSICIAN QUERY
PT Name: Jonathan Rayo Jr.  MR #: [id number]    Physician Query Form - Consultant Diagnosis Clarification     CDS/: Lou Wilson               Contact information:  [email or web address]    This form is a permanent document in the medical record.     Query Date: July 14, 2017      By submitting this query, we are merely seeking further clarification of documentation.  Please utilize your independent clinical judgment when addressing the question(s) below.      The Medical record contains the following:   Diagnosis Supporting Clinical Information Location in Medical Record   Findings:     Examination is compared with a study of 4/16/2011.   There is a retrocardiac opacity in the left lower lobe suggesting a left basilar atelectasis/infiltrate. No pleural effusion. Rest of the left upper lobe is clear the right lung is clear. There is borderline cardiomegaly. Atherosclerosis of the aortic arch.       Indication:      pneumonia   Current antibiotics: cefepime 1gm q8 and vancomycin 1500mg q12, 1qm vanco in ED and 1 gm cefepime ED   Based on their pharmacokinetics/protocol, reduce cefepime to q12 and load additional vanco 500 mg x1 dose now, then continue vancomycin 1250 mg q24 to obtain a trough of 15-20.  Trough to be drawn prior to 4th  dose on 7-10 @ 20:00     Chest xray 7/7                      PN 7/7                     Do you agree with the Consultants diagnosis of ___pneumonia________________?                 [   ]   Yes               [ x  ]   No                [   ]   Clinically insignificant               [   ]   Clinically undetermined               [   ]   Other/Clarification of findings: ___________________________________________                 Physician Query Form - Consultant Diagnosis Clarification